# Patient Record
Sex: FEMALE
[De-identification: names, ages, dates, MRNs, and addresses within clinical notes are randomized per-mention and may not be internally consistent; named-entity substitution may affect disease eponyms.]

---

## 2023-07-26 ENCOUNTER — NON-APPOINTMENT (OUTPATIENT)
Age: 38
End: 2023-07-26

## 2023-08-01 ENCOUNTER — APPOINTMENT (OUTPATIENT)
Dept: ORTHOPEDIC SURGERY | Facility: CLINIC | Age: 38
End: 2023-08-01
Payer: COMMERCIAL

## 2023-08-01 VITALS — DIASTOLIC BLOOD PRESSURE: 70 MMHG | SYSTOLIC BLOOD PRESSURE: 113 MMHG | HEART RATE: 79 BPM | OXYGEN SATURATION: 98 %

## 2023-08-01 DIAGNOSIS — S69.81XA OTHER SPECIFIED INJURIES OF RIGHT WRIST, HAND AND FINGER(S), INITIAL ENCOUNTER: ICD-10-CM

## 2023-08-01 PROBLEM — Z00.00 ENCOUNTER FOR PREVENTIVE HEALTH EXAMINATION: Status: ACTIVE | Noted: 2023-08-01

## 2023-08-01 PROCEDURE — 99203 OFFICE O/P NEW LOW 30 MIN: CPT

## 2023-08-01 NOTE — PHYSICAL EXAM
[de-identified] : General: Well-nourished, well-developed, alert, and in no acute distress. Head: Normocephalic. Eyes: Pupils equal, extraocular muscles intact, normal sclera. Nose: No nasal discharge. Cardiovascular: Extremities are warm and well perfused. Distal pulses are symmetric bilaterally. Respiratory: No labored breathing. Extremities: Sensation is intact distally bilaterally. Distal pulses are symmetric bilaterally Lymphatic: No regional lymphadenopathy, no lymphedema Neurologic: No focal deficits Skin: Normal skin color, texture, and turgor Psychiatric: Normal affect   MSK: Examination of [right] hand and wrist: Inspection: no erythema, ecchymosis, deformity, atrophy, scars, skin or nail changes.  Tender to palpation: TFCC Nontender to palpation: scaphoid, scapholunate ligament, lunotriquetral ligament,  pisotriquetral joint, hook of hamate, CMC joint, flexor retinaculum. ROM:wrist flexion [90] deg, wrist extension [70] deg, ulnar deviation [50] deg, radial deviation [20] deg, MCP flex [90] deg, pronation [90] deg, supination [90] deg, finger abd, finger add  Special tests: TFCC compression positive Gamez [negative] Tinel [negative] Durkan [negative] Phalen [negative]  Thumbs up (radial), fist (median), peace (ulnar), A-OK (AIN [employer]) intact  Examination of [left] hand and wrist: Inspection: no erythema, ecchymosis, deformity, atrophy, scars, skin or nail changes.  Nontender to palpation: scaphoid, scapholunate ligament, lunotriquetral ligament,  pisotriquetral joint, hook of hamate, CMC joint, dorsal wrist compartments, flexor retinaculum, radial ulnar joint or TFCC  ROM: full wrist flexion 90 deg, wrist extension 70 deg, ulnar deviation 50 deg, radial deviation 20 deg, MCP flex 90 deg, pronation 90 deg, supination 90 deg, finger abd, finger add Pain at end of ROM  Sensation is intact to light touch over the axillary, musculocutaneous, median, radial, and ulnar nerve distributions bilaterally. Capillary refill is less than two seconds. Radial pulses 2+ equal bilaterally. Strength testing shows 5/5 abduction, 5/5 external rotation, 5/5 internal rotation, 5/5 biceps, 5/5 triceps. 5/5 wrist extension, 5/5 intrinsics.  Reflexes 2+ biceps, brachioradialis. Cruz negative.  [de-identified] : XR right wrist (7/27/23): There is no evidence of fracture or dislocation. Negative ulnar variance.

## 2023-08-01 NOTE — ASSESSMENT
[FreeTextEntry1] : JIMMY MERRILL is a 37 year old female with right wrist pain. I discussed with the patient that their symptoms, signs, and imaging are most consistent with TFCC injury. We reviewed the natural history of this condition and treatment options. We agreed on the following plan:  XR reviewed with patient. Activity modification Start Home Exercises for TFCC wrist conditioning. Demonstration and handout provided.  Occupational therapy. Referral provided. Medication: Naproxen or Ibuprofen prn. Alternate with Diclofenac gel 1% prn. Discussed ergonomic work space design, routine breaks with stretching, awareness of hand and wrist postures when using mobile devices, reading etc. Referral for TFCC brace and examples provided. Advanced imaging: consider MRI if no symptomatic improvement. Follow up in 6-8 weeks.
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
## 2023-09-26 ENCOUNTER — APPOINTMENT (OUTPATIENT)
Dept: ORTHOPEDIC SURGERY | Facility: CLINIC | Age: 38
End: 2023-09-26
Payer: COMMERCIAL

## 2023-09-26 VITALS
TEMPERATURE: 97.16 F | HEART RATE: 67 BPM | HEIGHT: 64 IN | SYSTOLIC BLOOD PRESSURE: 118 MMHG | BODY MASS INDEX: 28.68 KG/M2 | OXYGEN SATURATION: 100 % | DIASTOLIC BLOOD PRESSURE: 79 MMHG | WEIGHT: 168 LBS

## 2023-09-26 PROCEDURE — 99213 OFFICE O/P EST LOW 20 MIN: CPT

## 2023-10-17 ENCOUNTER — APPOINTMENT (OUTPATIENT)
Dept: ORTHOPEDIC SURGERY | Facility: CLINIC | Age: 38
End: 2023-10-17
Payer: COMMERCIAL

## 2023-10-17 DIAGNOSIS — S69.81XD OTHER SPECIFIED INJURIES OF RIGHT WRIST, HAND AND FINGER(S), SUBSEQUENT ENCOUNTER: ICD-10-CM

## 2023-10-17 DIAGNOSIS — M77.8 OTHER ENTHESOPATHIES, NOT ELSEWHERE CLASSIFIED: ICD-10-CM

## 2023-10-17 PROCEDURE — 99442: CPT

## 2023-10-18 PROBLEM — S69.81XD INJURY OF TRIANGULAR FIBROCARTILAGE COMPLEX (TFCC) OF RIGHT WRIST, SUBSEQUENT ENCOUNTER: Status: ACTIVE | Noted: 2023-09-26

## 2023-10-18 PROBLEM — M77.8 EXTENSOR CARPI ULNARIS TENDINITIS: Status: ACTIVE | Noted: 2023-09-26
